# Patient Record
Sex: FEMALE | Race: WHITE | NOT HISPANIC OR LATINO | Employment: UNEMPLOYED | URBAN - METROPOLITAN AREA
[De-identification: names, ages, dates, MRNs, and addresses within clinical notes are randomized per-mention and may not be internally consistent; named-entity substitution may affect disease eponyms.]

---

## 2022-12-09 ENCOUNTER — APPOINTMENT (OUTPATIENT)
Dept: RADIOLOGY | Facility: HOSPITAL | Age: 32
End: 2022-12-09
Attending: EMERGENCY MEDICINE
Payer: COMMERCIAL

## 2022-12-09 ENCOUNTER — HOSPITAL ENCOUNTER (EMERGENCY)
Facility: HOSPITAL | Age: 32
Discharge: HOME | End: 2022-12-09
Admitting: PHYSICIAN ASSISTANT
Payer: COMMERCIAL

## 2022-12-09 VITALS
TEMPERATURE: 98.3 F | OXYGEN SATURATION: 98 % | HEART RATE: 100 BPM | WEIGHT: 142 LBS | RESPIRATION RATE: 23 BRPM | DIASTOLIC BLOOD PRESSURE: 74 MMHG | SYSTOLIC BLOOD PRESSURE: 134 MMHG

## 2022-12-09 DIAGNOSIS — J45.20 MILD INTERMITTENT ASTHMA WITHOUT COMPLICATION: ICD-10-CM

## 2022-12-09 LAB
ANION GAP SERPL CALCULATED.3IONS-SCNC: 17 MMOL/L (ref 7–15)
BASOPHILS # BLD AUTO: 0 10E3/UL (ref 0–0.2)
BASOPHILS NFR BLD AUTO: 1 %
BUN SERPL-MCNC: 9.3 MG/DL (ref 6–20)
CALCIUM SERPL-MCNC: 9.2 MG/DL (ref 8.6–10)
CHLORIDE SERPL-SCNC: 104 MMOL/L (ref 98–107)
CREAT SERPL-MCNC: 0.67 MG/DL (ref 0.51–0.95)
DEPRECATED HCO3 PLAS-SCNC: 25 MMOL/L (ref 22–29)
EOSINOPHIL # BLD AUTO: 0 10E3/UL (ref 0–0.7)
EOSINOPHIL NFR BLD AUTO: 0 %
ERYTHROCYTE [DISTWIDTH] IN BLOOD BY AUTOMATED COUNT: 15.4 % (ref 10–15)
GFR SERPL CREATININE-BSD FRML MDRD: >90 ML/MIN/1.73M2
GLUCOSE SERPL-MCNC: 123 MG/DL (ref 70–99)
HCT VFR BLD AUTO: 35.5 % (ref 35–47)
HGB BLD-MCNC: 12 G/DL (ref 11.7–15.7)
IMM GRANULOCYTES # BLD: 0 10E3/UL
IMM GRANULOCYTES NFR BLD: 0 %
LYMPHOCYTES # BLD AUTO: 3.7 10E3/UL (ref 0.8–5.3)
LYMPHOCYTES NFR BLD AUTO: 67 %
MCH RBC QN AUTO: 34.5 PG (ref 26.5–33)
MCHC RBC AUTO-ENTMCNC: 33.8 G/DL (ref 31.5–36.5)
MCV RBC AUTO: 102 FL (ref 78–100)
MONOCYTES # BLD AUTO: 0.3 10E3/UL (ref 0–1.3)
MONOCYTES NFR BLD AUTO: 6 %
NEUTROPHILS # BLD AUTO: 1.4 10E3/UL (ref 1.6–8.3)
NEUTROPHILS NFR BLD AUTO: 26 %
NRBC # BLD AUTO: 0 10E3/UL
NRBC BLD AUTO-RTO: 0 /100
PLATELET # BLD AUTO: 250 10E3/UL (ref 150–450)
POTASSIUM SERPL-SCNC: 3.9 MMOL/L (ref 3.4–5.3)
RBC # BLD AUTO: 3.48 10E6/UL (ref 3.8–5.2)
SODIUM SERPL-SCNC: 146 MMOL/L (ref 136–145)
WBC # BLD AUTO: 5.6 10E3/UL (ref 4–11)

## 2022-12-09 PROCEDURE — 999N000157 HC STATISTIC RCP TIME EA 10 MIN

## 2022-12-09 PROCEDURE — 71046 X-RAY EXAM CHEST 2 VIEWS: CPT

## 2022-12-09 PROCEDURE — 85004 AUTOMATED DIFF WBC COUNT: CPT | Performed by: EMERGENCY MEDICINE

## 2022-12-09 PROCEDURE — 99285 EMERGENCY DEPT VISIT HI MDM: CPT | Mod: 25

## 2022-12-09 PROCEDURE — 80048 BASIC METABOLIC PNL TOTAL CA: CPT | Performed by: EMERGENCY MEDICINE

## 2022-12-09 PROCEDURE — 250N000009 HC RX 250: Performed by: EMERGENCY MEDICINE

## 2022-12-09 PROCEDURE — 94640 AIRWAY INHALATION TREATMENT: CPT

## 2022-12-09 PROCEDURE — 36415 COLL VENOUS BLD VENIPUNCTURE: CPT | Performed by: EMERGENCY MEDICINE

## 2022-12-09 PROCEDURE — 93005 ELECTROCARDIOGRAM TRACING: CPT | Performed by: EMERGENCY MEDICINE

## 2022-12-09 RX ORDER — ALBUTEROL SULFATE 90 UG/1
2 AEROSOL, METERED RESPIRATORY (INHALATION) EVERY 6 HOURS PRN
Qty: 18 G | Refills: 0 | Status: SHIPPED | OUTPATIENT
Start: 2022-12-09

## 2022-12-09 RX ORDER — IPRATROPIUM BROMIDE AND ALBUTEROL SULFATE 2.5; .5 MG/3ML; MG/3ML
1 SOLUTION RESPIRATORY (INHALATION) EVERY 6 HOURS PRN
Qty: 90 ML | Refills: 1 | Status: SHIPPED | OUTPATIENT
Start: 2022-12-09

## 2022-12-09 RX ORDER — IPRATROPIUM BROMIDE AND ALBUTEROL SULFATE 2.5; .5 MG/3ML; MG/3ML
3 SOLUTION RESPIRATORY (INHALATION) ONCE
Status: COMPLETED | OUTPATIENT
Start: 2022-12-09 | End: 2022-12-09

## 2022-12-09 RX ADMIN — IPRATROPIUM BROMIDE AND ALBUTEROL SULFATE 3 ML: 2.5; .5 SOLUTION RESPIRATORY (INHALATION) at 12:10

## 2022-12-09 ASSESSMENT — ENCOUNTER SYMPTOMS
DIARRHEA: 0
ABDOMINAL PAIN: 0
VOMITING: 0
SHORTNESS OF BREATH: 1
COUGH: 1
NAUSEA: 0

## 2022-12-09 ASSESSMENT — ACTIVITIES OF DAILY LIVING (ADL): ADLS_ACUITY_SCORE: 35

## 2022-12-09 NOTE — ED NOTES
ED Triage Provider Note  [facility] EMERGENCY DEPARTMENT  Encounter Date: Dec 9, 2022    History:  Chief Complaint   Patient presents with     Shortness of Breath     July Baez is a 32 year old female who presents to the ED with shortness of breath. Patient endorses shortness of breath since Thanksgiving and has yet to get her asthma medication filled. Friend in triage states she has been more tired and confused today. No fevers, chest pain, falls or trauma.     Review of Systems:  Positive for shortness of breath    Exam:  BP (!) 142/88   Pulse 108   Temp 98.3  F (36.8  C) (Oral)   Resp 18   Wt 64.4 kg (142 lb)   SpO2 97%   General: No acute distress. Appears stated age.   Cardio: tachycardic rate, extremities well perfused  Resp: Normal work of breathing, grossly normal respiratory rate  Neuro: Alert. Facial movement grossly symmetric. Grossly intact strength.       Medical Decision Making:  Patient arriving to the ED with problem as above. A medical screening exam was performed. Initial differential diagnosis includes but not limited to Asthma exacerbation, less likely ACS, PNA, Covid..    Labs, imaging, meds orders initiated from Triage. The patient is most appropriate to be immediately roomed.       CHUY ELLINGTON MD  12/9/2022 at 11:50 AM     Chuy Ellington MD  12/09/22 3700

## 2022-12-09 NOTE — ED PROVIDER NOTES
EMERGENCY DEPARTMENT ENCOUNTER      NAME: July Baez  AGE: 32 year old female  YOB: 1990  MRN: [id number]  EVALUATION DATE & TIME: 12/9/2022 11:54 AM    PCP: No primary care provider on file.    ED PROVIDER: Emile Javed PA-C      Chief Complaint   Patient presents with     Shortness of Breath       FINAL IMPRESSION:  1. Mild intermittent asthma without complication        MEDICAL DECISION MAKING:    Pertinent Labs & Imaging studies reviewed. (See chart for details)  32 year old female presents to the Emergency Department for evaluation of shortness of breath likely related to asthma exacerbation.    Upon my interview and arrival the patient had a very received her nebulizing treatment.  She is feeling significantly better.  She has normal vital signs, specifically no persistent tachycardia, no hypoxia, she does not appear tachypneic.  No current wheezing on exam.  She denies any acute chest pain.  At this point time screening labs CBC and BMP are reviewed, chest x-ray is clear.  I do not feel that there is need for antibiotics.  I do not feel that there is need for oral steroids.  I will provide a refill of her inhaler which she has been out of as well as prescribe some neb solution and machine for her to use as needed at home.  If she has new or worsening symptoms she can consider returning to the ED for repeat assessment.    Medical Decision Making    History:    Supplemental history from: N/A    External Record(s) reviewed: Documented in HPI, if applicable.    Work Up:    Chart documentation includes differential considered and any EKGs or imaging interpreted by provider.    In additional to work up documented, I considered the following work up: See chart documentation, if applicable.    External consultation:    Discussion of management with another provider: See chart documentation, if applicable    Complicating factors:    Care impacted by chronic illness: Chronic Lung  Disease    Care affected by social determinants of health: N/A    Disposition considerations: Discharge. I prescribed additional prescription strength medication(s) as charted. N/A.        ED COURSE  12:32 PM I met with the patient, obtained history, performed an initial exam, and discussed options and plan for diagnostics and treatment here in the ED.    At the conclusion of the encounter I discussed the results of all of the tests and the disposition. The questions were answered. The patient or family acknowledged understanding and was agreeable with the care plan.     MEDICATIONS GIVEN IN THE EMERGENCY:  Medications   ipratropium - albuterol 0.5 mg/2.5 mg/3 mL (DUONEB) neb solution 3 mL (3 mLs Nebulization Given 12/9/22 1210)       NEW PRESCRIPTIONS STARTED AT TODAY'S ER VISIT  New Prescriptions    ALBUTEROL (PROAIR HFA/PROVENTIL HFA/VENTOLIN HFA) 108 (90 BASE) MCG/ACT INHALER    Inhale 2 puffs into the lungs every 6 hours as needed for shortness of breath / dyspnea or wheezing    IPRATROPIUM - ALBUTEROL 0.5 MG/2.5 MG/3 ML (DUONEB) 0.5-2.5 (3) MG/3ML NEB SOLUTION    Take 1 vial (3 mLs) by nebulization every 6 hours as needed for shortness of breath / dyspnea or wheezing    RESPIRATORY THERAPY SUPPLIES (NEBULIZER) PEG    Take 1 Device by mouth 4 times daily as needed (shortness of breath)     =================================================================    HPI    Patient information was obtained from: patient    Use of Interpretor: N/A    July Baez is a 32 year old female with a reported history of asthma and DM who presents to this ED by walk in for evaluation of shortness of breath. Patient reports a history of asthma and occasionally has intermittent flares. She reports that she has been feeling more short of breath since 11/24/22 (~2 weeks ago). She has an inhaler at home but reports that it ran out, and states she went to [facility] ED a couple of days ago to get her inhaler prescription  refilled, but LWBS due to long wait times. Presents to the ED again today for continued shortness of breath. Also endorses a dry cough. Her inhaler has ran out and she does not have a nebulizer at home. She received a DuoNeb today on arrival to the ED and reports that she is already feeling better. Denies any associated chest pain. Denies any abdominal pain, nausea, vomiting, or diarrhea. She does not smoke. She also reports that she has been told she is a diabetic, but states that she does not take any medication or insulin for diabetes. She denies any other complaints or concerns.      REVIEW OF SYSTEMS  Review of Systems   Respiratory: Positive for cough (dry) and shortness of breath.    Cardiovascular: Negative for chest pain.   Gastrointestinal: Negative for abdominal pain, diarrhea, nausea and vomiting.   All other systems reviewed and are negative.      PAST MEDICAL HISTORY:  History reviewed. No pertinent past medical history.    PAST SURGICAL HISTORY:  History reviewed. No pertinent surgical history.    CURRENT MEDICATIONS:    No current facility-administered medications for this encounter.    Current Outpatient Medications:      albuterol (PROAIR HFA/PROVENTIL HFA/VENTOLIN HFA) 108 (90 Base) MCG/ACT inhaler, Inhale 2 puffs into the lungs every 6 hours as needed for shortness of breath / dyspnea or wheezing, Disp: 18 g, Rfl: 0     ipratropium - albuterol 0.5 mg/2.5 mg/3 mL (DUONEB) 0.5-2.5 (3) MG/3ML neb solution, Take 1 vial (3 mLs) by nebulization every 6 hours as needed for shortness of breath / dyspnea or wheezing, Disp: 90 mL, Rfl: 1     Respiratory Therapy Supplies (NEBULIZER) PEG, Take 1 Device by mouth 4 times daily as needed (shortness of breath), Disp: 1 each, Rfl: 0    ALLERGIES:  No Known Allergies    FAMILY HISTORY:  History reviewed. No pertinent family history.    SOCIAL HISTORY:   Social History     Socioeconomic History     Marital status: Single     Spouse name: None     Number of  children: None     Years of education: None     Highest education level: None       VITALS:  Patient Vitals for the past 24 hrs:   BP Temp Temp src Pulse Resp SpO2 Weight   12/09/22 1230 127/74 -- -- 99 -- 98 % --   12/09/22 1217 -- -- -- 93 23 100 % --   12/09/22 1210 -- -- -- -- -- 100 % --   12/09/22 1152 (!) 142/88 98.3  F (36.8  C) Oral 108 18 97 % 64.4 kg (142 lb)       PHYSICAL EXAM    Physical Exam  Vitals and nursing note reviewed.   Constitutional:       General: She is not in acute distress.     Appearance: She is normal weight. She is not ill-appearing or toxic-appearing.   HENT:      Head: Normocephalic.      Right Ear: External ear normal.      Left Ear: External ear normal.      Nose: Nose normal.      Mouth/Throat:      Mouth: Mucous membranes are moist.   Eyes:      Conjunctiva/sclera: Conjunctivae normal.   Cardiovascular:      Rate and Rhythm: Normal rate.      Pulses: Normal pulses.      Heart sounds: No murmur heard.  Pulmonary:      Effort: Pulmonary effort is normal. No respiratory distress.      Breath sounds: No stridor. No wheezing, rhonchi or rales.   Musculoskeletal:         General: No tenderness or deformity. Normal range of motion.      Cervical back: Normal range of motion.      Right lower leg: No edema.      Left lower leg: No edema.   Skin:     General: Skin is warm and dry.      Findings: No rash.   Neurological:      General: No focal deficit present.      Mental Status: She is alert.      Sensory: No sensory deficit.      Motor: No weakness.          LAB:  All pertinent labs reviewed and interpreted.  Results for orders placed or performed during the hospital encounter of 12/09/22   XR Chest 2 Views    Impression    IMPRESSION: Negative chest.   Basic metabolic panel   Result Value Ref Range    Sodium 146 (H) 136 - 145 mmol/L    Potassium 3.9 3.4 - 5.3 mmol/L    Chloride 104 98 - 107 mmol/L    Carbon Dioxide (CO2) 25 22 - 29 mmol/L    Anion Gap 17 (H) 7 - 15 mmol/L    Urea  Nitrogen 9.3 6.0 - 20.0 mg/dL    Creatinine 0.67 0.51 - 0.95 mg/dL    Calcium 9.2 8.6 - 10.0 mg/dL    Glucose 123 (H) 70 - 99 mg/dL    GFR Estimate >90 >60 mL/min/1.73m2   CBC with platelets and differential   Result Value Ref Range    WBC Count 5.6 4.0 - 11.0 10e3/uL    RBC Count 3.48 (L) 3.80 - 5.20 10e6/uL    Hemoglobin 12.0 11.7 - 15.7 g/dL    Hematocrit 35.5 35.0 - 47.0 %     (H) 78 - 100 fL    MCH 34.5 (H) 26.5 - 33.0 pg    MCHC 33.8 31.5 - 36.5 g/dL    RDW 15.4 (H) 10.0 - 15.0 %    Platelet Count 250 150 - 450 10e3/uL    % Neutrophils 26 %    % Lymphocytes 67 %    % Monocytes 6 %    % Eosinophils 0 %    % Basophils 1 %    % Immature Granulocytes 0 %    NRBCs per 100 WBC 0 <1 /100    Absolute Neutrophils 1.4 (L) 1.6 - 8.3 10e3/uL    Absolute Lymphocytes 3.7 0.8 - 5.3 10e3/uL    Absolute Monocytes 0.3 0.0 - 1.3 10e3/uL    Absolute Eosinophils 0.0 0.0 - 0.7 10e3/uL    Absolute Basophils 0.0 0.0 - 0.2 10e3/uL    Absolute Immature Granulocytes 0.0 <=0.4 10e3/uL    Absolute NRBCs 0.0 10e3/uL       RADIOLOGY:  Reviewed all pertinent imaging. Please see official radiology report.  XR Chest 2 Views   Final Result   IMPRESSION: Negative chest.          EKG:    Performed at: 12:04    Impression: Sinus tachycardia at 102 bpm.    Rate: 102 bpm  Rhythm: Sinus  Axis: 75, 45, 42  IN Interval: 150  QRS Interval: 66  QTc Interval: 456  ST Changes: None  Comparison: No previous EKGs available for comparison.    I have independently reviewed and interpreted the EKG(s) documented above.        I, August Cordero, am serving as a scribe to document services personally performed by Emile Javed PA-C based on my observation and the provider's statements to me. I, Emile Javed PA-C attest that August Cordero is acting in a scribe capacity, has observed my performance of the services and has documented them in accordance with my direction.    Emile Javed PA-C  Emergency Medicine  [facility]     Emile Javed,  MALCOLM  12/09/22 [id number]

## 2022-12-09 NOTE — ED TRIAGE NOTES
Patient presents here with complaint of shortness of breath that she reports that has occurred over the  Past three days.

## 2024-07-26 ENCOUNTER — HOSPITAL ENCOUNTER (EMERGENCY)
Facility: CLINIC | Age: 34
Discharge: HOME | End: 2024-07-26
Attending: STUDENT IN AN ORGANIZED HEALTH CARE EDUCATION/TRAINING PROGRAM | Admitting: STUDENT IN AN ORGANIZED HEALTH CARE EDUCATION/TRAINING PROGRAM
Payer: COMMERCIAL

## 2024-07-26 VITALS
OXYGEN SATURATION: 95 % | TEMPERATURE: 98.8 F | WEIGHT: 170 LBS | BODY MASS INDEX: 27.32 KG/M2 | DIASTOLIC BLOOD PRESSURE: 82 MMHG | SYSTOLIC BLOOD PRESSURE: 125 MMHG | HEART RATE: 84 BPM | RESPIRATION RATE: 22 BRPM | HEIGHT: 66 IN

## 2024-07-26 DIAGNOSIS — K29.70 GASTRITIS WITHOUT BLEEDING, UNSPECIFIED CHRONICITY, UNSPECIFIED GASTRITIS TYPE: ICD-10-CM

## 2024-07-26 DIAGNOSIS — N39.0 ACUTE UTI: ICD-10-CM

## 2024-07-26 LAB
ALBUMIN SERPL BCG-MCNC: 4.7 G/DL (ref 3.5–5.2)
ALBUMIN UR-MCNC: 20 MG/DL
ALP SERPL-CCNC: 94 U/L (ref 40–150)
ALT SERPL W P-5'-P-CCNC: 63 U/L (ref 0–50)
ANION GAP SERPL CALCULATED.3IONS-SCNC: 12 MMOL/L (ref 7–15)
APPEARANCE UR: ABNORMAL
AST SERPL W P-5'-P-CCNC: 57 U/L (ref 0–45)
ATRIAL RATE - MUSE: 102 BPM
BACTERIA #/AREA URNS HPF: ABNORMAL /HPF
BASOPHILS # BLD AUTO: 0 10E3/UL (ref 0–0.2)
BASOPHILS NFR BLD AUTO: 0 %
BILIRUB DIRECT SERPL-MCNC: 0.31 MG/DL (ref 0–0.3)
BILIRUB SERPL-MCNC: 1 MG/DL
BILIRUB UR QL STRIP: NEGATIVE
BUN SERPL-MCNC: 8.7 MG/DL (ref 6–20)
CALCIUM SERPL-MCNC: 10 MG/DL (ref 8.8–10.4)
CHLORIDE SERPL-SCNC: 91 MMOL/L (ref 98–107)
COLOR UR AUTO: YELLOW
CREAT SERPL-MCNC: 0.95 MG/DL (ref 0.51–0.95)
DIASTOLIC BLOOD PRESSURE - MUSE: NORMAL MMHG
EGFRCR SERPLBLD CKD-EPI 2021: 80 ML/MIN/1.73M2
EOSINOPHIL # BLD AUTO: 0 10E3/UL (ref 0–0.7)
EOSINOPHIL NFR BLD AUTO: 0 %
ERYTHROCYTE [DISTWIDTH] IN BLOOD BY AUTOMATED COUNT: 14.7 % (ref 10–15)
GLUCOSE SERPL-MCNC: 112 MG/DL (ref 70–99)
GLUCOSE UR STRIP-MCNC: NEGATIVE MG/DL
HCG UR QL: NEGATIVE
HCO3 SERPL-SCNC: 32 MMOL/L (ref 22–29)
HCT VFR BLD AUTO: 38.2 % (ref 35–47)
HGB BLD-MCNC: 13.2 G/DL (ref 11.7–15.7)
HGB UR QL STRIP: NEGATIVE
IMM GRANULOCYTES # BLD: 0 10E3/UL
IMM GRANULOCYTES NFR BLD: 0 %
INTERPRETATION ECG - MUSE: NORMAL
KETONES UR STRIP-MCNC: ABNORMAL MG/DL
LEUKOCYTE ESTERASE UR QL STRIP: ABNORMAL
LIPASE SERPL-CCNC: 88 U/L (ref 13–60)
LYMPHOCYTES # BLD AUTO: 2.2 10E3/UL (ref 0.8–5.3)
LYMPHOCYTES NFR BLD AUTO: 26 %
MAGNESIUM SERPL-MCNC: 1.5 MG/DL (ref 1.7–2.3)
MCH RBC QN AUTO: 35.5 PG (ref 26.5–33)
MCHC RBC AUTO-ENTMCNC: 34.6 G/DL (ref 31.5–36.5)
MCV RBC AUTO: 103 FL (ref 78–100)
MONOCYTES # BLD AUTO: 0.6 10E3/UL (ref 0–1.3)
MONOCYTES NFR BLD AUTO: 8 %
MUCOUS THREADS #/AREA URNS LPF: PRESENT /LPF
NEUTROPHILS # BLD AUTO: 5.7 10E3/UL (ref 1.6–8.3)
NEUTROPHILS NFR BLD AUTO: 66 %
NITRATE UR QL: POSITIVE
NRBC # BLD AUTO: 0 10E3/UL
NRBC BLD AUTO-RTO: 0 /100
P AXIS - MUSE: 72 DEGREES
PH UR STRIP: 6 [PH] (ref 5–7)
PLATELET # BLD AUTO: 150 10E3/UL (ref 150–450)
POTASSIUM SERPL-SCNC: 3.2 MMOL/L (ref 3.4–5.3)
PR INTERVAL - MUSE: 140 MS
PROT SERPL-MCNC: 7.7 G/DL (ref 6.4–8.3)
QRS DURATION - MUSE: 70 MS
QT - MUSE: 354 MS
QTC - MUSE: 461 MS
R AXIS - MUSE: 3 DEGREES
RBC # BLD AUTO: 3.72 10E6/UL (ref 3.8–5.2)
RBC URINE: 5 /HPF
SODIUM SERPL-SCNC: 135 MMOL/L (ref 135–145)
SP GR UR STRIP: 1.01 (ref 1–1.03)
SQUAMOUS EPITHELIAL: 2 /HPF
SYSTOLIC BLOOD PRESSURE - MUSE: NORMAL MMHG
T AXIS - MUSE: 23 DEGREES
TROPONIN T SERPL HS-MCNC: <6 NG/L
TSH SERPL DL<=0.005 MIU/L-ACNC: 2.95 UIU/ML (ref 0.3–4.2)
UROBILINOGEN UR STRIP-MCNC: <2 MG/DL
VENTRICULAR RATE- MUSE: 102 BPM
WBC # BLD AUTO: 8.5 10E3/UL (ref 4–11)
WBC URINE: 56 /HPF

## 2024-07-26 PROCEDURE — 81003 URINALYSIS AUTO W/O SCOPE: CPT | Performed by: STUDENT IN AN ORGANIZED HEALTH CARE EDUCATION/TRAINING PROGRAM

## 2024-07-26 PROCEDURE — 84443 ASSAY THYROID STIM HORMONE: CPT | Performed by: STUDENT IN AN ORGANIZED HEALTH CARE EDUCATION/TRAINING PROGRAM

## 2024-07-26 PROCEDURE — 250N000011 HC RX IP 250 OP 636: Performed by: STUDENT IN AN ORGANIZED HEALTH CARE EDUCATION/TRAINING PROGRAM

## 2024-07-26 PROCEDURE — 82248 BILIRUBIN DIRECT: CPT | Performed by: STUDENT IN AN ORGANIZED HEALTH CARE EDUCATION/TRAINING PROGRAM

## 2024-07-26 PROCEDURE — 81025 URINE PREGNANCY TEST: CPT | Performed by: STUDENT IN AN ORGANIZED HEALTH CARE EDUCATION/TRAINING PROGRAM

## 2024-07-26 PROCEDURE — 258N000003 HC RX IP 258 OP 636: Performed by: STUDENT IN AN ORGANIZED HEALTH CARE EDUCATION/TRAINING PROGRAM

## 2024-07-26 PROCEDURE — 83690 ASSAY OF LIPASE: CPT | Performed by: STUDENT IN AN ORGANIZED HEALTH CARE EDUCATION/TRAINING PROGRAM

## 2024-07-26 PROCEDURE — 36415 COLL VENOUS BLD VENIPUNCTURE: CPT | Performed by: STUDENT IN AN ORGANIZED HEALTH CARE EDUCATION/TRAINING PROGRAM

## 2024-07-26 PROCEDURE — 96360 HYDRATION IV INFUSION INIT: CPT | Performed by: STUDENT IN AN ORGANIZED HEALTH CARE EDUCATION/TRAINING PROGRAM

## 2024-07-26 PROCEDURE — 93005 ELECTROCARDIOGRAM TRACING: CPT | Performed by: STUDENT IN AN ORGANIZED HEALTH CARE EDUCATION/TRAINING PROGRAM

## 2024-07-26 PROCEDURE — 250N000013 HC RX MED GY IP 250 OP 250 PS 637: Performed by: STUDENT IN AN ORGANIZED HEALTH CARE EDUCATION/TRAINING PROGRAM

## 2024-07-26 PROCEDURE — 87086 URINE CULTURE/COLONY COUNT: CPT | Performed by: STUDENT IN AN ORGANIZED HEALTH CARE EDUCATION/TRAINING PROGRAM

## 2024-07-26 PROCEDURE — 80053 COMPREHEN METABOLIC PANEL: CPT | Performed by: STUDENT IN AN ORGANIZED HEALTH CARE EDUCATION/TRAINING PROGRAM

## 2024-07-26 PROCEDURE — 87186 SC STD MICRODIL/AGAR DIL: CPT | Performed by: STUDENT IN AN ORGANIZED HEALTH CARE EDUCATION/TRAINING PROGRAM

## 2024-07-26 PROCEDURE — 99284 EMERGENCY DEPT VISIT MOD MDM: CPT | Mod: 25 | Performed by: STUDENT IN AN ORGANIZED HEALTH CARE EDUCATION/TRAINING PROGRAM

## 2024-07-26 PROCEDURE — 84484 ASSAY OF TROPONIN QUANT: CPT | Performed by: STUDENT IN AN ORGANIZED HEALTH CARE EDUCATION/TRAINING PROGRAM

## 2024-07-26 PROCEDURE — 93005 ELECTROCARDIOGRAM TRACING: CPT | Performed by: EMERGENCY MEDICINE

## 2024-07-26 PROCEDURE — 96361 HYDRATE IV INFUSION ADD-ON: CPT | Performed by: STUDENT IN AN ORGANIZED HEALTH CARE EDUCATION/TRAINING PROGRAM

## 2024-07-26 PROCEDURE — 85025 COMPLETE CBC W/AUTO DIFF WBC: CPT | Performed by: STUDENT IN AN ORGANIZED HEALTH CARE EDUCATION/TRAINING PROGRAM

## 2024-07-26 PROCEDURE — 83735 ASSAY OF MAGNESIUM: CPT | Performed by: STUDENT IN AN ORGANIZED HEALTH CARE EDUCATION/TRAINING PROGRAM

## 2024-07-26 RX ORDER — POTASSIUM CHLORIDE 1.5 G/1.58G
40 POWDER, FOR SOLUTION ORAL ONCE
Status: COMPLETED | OUTPATIENT
Start: 2024-07-26 | End: 2024-07-26

## 2024-07-26 RX ORDER — FAMOTIDINE 20 MG/1
20 TABLET, FILM COATED ORAL ONCE
Status: COMPLETED | OUTPATIENT
Start: 2024-07-26 | End: 2024-07-26

## 2024-07-26 RX ORDER — MAGNESIUM HYDROXIDE/ALUMINUM HYDROXICE/SIMETHICONE 120; 1200; 1200 MG/30ML; MG/30ML; MG/30ML
15 SUSPENSION ORAL ONCE
Status: COMPLETED | OUTPATIENT
Start: 2024-07-26 | End: 2024-07-26

## 2024-07-26 RX ORDER — CEFUROXIME AXETIL 250 MG/1
250 TABLET ORAL 2 TIMES DAILY
Qty: 10 TABLET | Refills: 0 | Status: SHIPPED | OUTPATIENT
Start: 2024-07-26

## 2024-07-26 RX ORDER — ONDANSETRON 4 MG/1
4 TABLET, ORALLY DISINTEGRATING ORAL EVERY 8 HOURS PRN
Qty: 10 TABLET | Refills: 0 | Status: SHIPPED | OUTPATIENT
Start: 2024-07-26 | End: 2024-07-29

## 2024-07-26 RX ORDER — CEFUROXIME AXETIL 250 MG/1
250 TABLET ORAL ONCE
Status: COMPLETED | OUTPATIENT
Start: 2024-07-26 | End: 2024-07-26

## 2024-07-26 RX ORDER — SUCRALFATE 1 G/1
1 TABLET ORAL 4 TIMES DAILY
Qty: 40 TABLET | Refills: 0 | Status: SHIPPED | OUTPATIENT
Start: 2024-07-26

## 2024-07-26 RX ORDER — PANTOPRAZOLE SODIUM 40 MG/1
40 TABLET, DELAYED RELEASE ORAL ONCE
Status: COMPLETED | OUTPATIENT
Start: 2024-07-26 | End: 2024-07-26

## 2024-07-26 RX ORDER — SUCRALFATE 1 G/1
1 TABLET ORAL ONCE
Status: COMPLETED | OUTPATIENT
Start: 2024-07-26 | End: 2024-07-26

## 2024-07-26 RX ORDER — POTASSIUM CHLORIDE 1500 MG/1
40 TABLET, EXTENDED RELEASE ORAL ONCE
Status: COMPLETED | OUTPATIENT
Start: 2024-07-26 | End: 2024-07-26

## 2024-07-26 RX ORDER — FAMOTIDINE 20 MG/1
20 TABLET, FILM COATED ORAL 2 TIMES DAILY
Qty: 60 TABLET | Refills: 0 | Status: SHIPPED | OUTPATIENT
Start: 2024-07-26

## 2024-07-26 RX ORDER — ONDANSETRON 4 MG/1
4 TABLET, ORALLY DISINTEGRATING ORAL ONCE
Status: COMPLETED | OUTPATIENT
Start: 2024-07-26 | End: 2024-07-26

## 2024-07-26 RX ADMIN — SODIUM CHLORIDE 1000 ML: 9 INJECTION, SOLUTION INTRAVENOUS at 16:31

## 2024-07-26 RX ADMIN — SUCRALFATE 1 G: 1 TABLET ORAL at 17:50

## 2024-07-26 RX ADMIN — ALUMINUM HYDROXIDE, MAGNESIUM HYDROXIDE, AND SIMETHICONE 15 ML: 1200; 120; 1200 SUSPENSION ORAL at 16:33

## 2024-07-26 RX ADMIN — POTASSIUM CHLORIDE 40 MEQ: 1500 TABLET, EXTENDED RELEASE ORAL at 17:20

## 2024-07-26 RX ADMIN — FAMOTIDINE 20 MG: 20 TABLET, FILM COATED ORAL at 17:50

## 2024-07-26 RX ADMIN — CEFUROXIME AXETIL 250 MG: 250 TABLET ORAL at 19:30

## 2024-07-26 RX ADMIN — PANTOPRAZOLE SODIUM 40 MG: 40 TABLET, DELAYED RELEASE ORAL at 16:33

## 2024-07-26 RX ADMIN — ONDANSETRON 4 MG: 4 TABLET, ORALLY DISINTEGRATING ORAL at 16:15

## 2024-07-26 ASSESSMENT — COLUMBIA-SUICIDE SEVERITY RATING SCALE - C-SSRS
2. HAVE YOU ACTUALLY HAD ANY THOUGHTS OF KILLING YOURSELF IN THE PAST MONTH?: NO
6. HAVE YOU EVER DONE ANYTHING, STARTED TO DO ANYTHING, OR PREPARED TO DO ANYTHING TO END YOUR LIFE?: NO
1. IN THE PAST MONTH, HAVE YOU WISHED YOU WERE DEAD OR WISHED YOU COULD GO TO SLEEP AND NOT WAKE UP?: NO

## 2024-07-26 ASSESSMENT — ACTIVITIES OF DAILY LIVING (ADL)
ADLS_ACUITY_SCORE: 37
ADLS_ACUITY_SCORE: 37
ADLS_ACUITY_SCORE: 33
ADLS_ACUITY_SCORE: 35

## 2024-07-26 NOTE — ED TRIAGE NOTES
Pt arrives with chest pain that started 2 weeks ago. Went to  at that time and was told she had a fatty liver. Hx of pancreatitis.     Triage Assessment (Adult)       Row Name 07/26/24 1504          Triage Assessment    Airway WDL WDL        Respiratory WDL    Respiratory WDL WDL        Skin Circulation/Temperature WDL    Skin Circulation/Temperature WDL WDL        Cardiac WDL    Cardiac WDL chest pain     Cardiac Rhythm ST        Chest Pain Assessment    Chest Pain Location anterior chest, right;anterior chest, left;epigastric     Associated Signs/Symptoms diaphoresis;vomiting;tachycardia        Peripheral/Neurovascular WDL    Peripheral Neurovascular WDL WDL        Cognitive/Neuro/Behavioral WDL    Cognitive/Neuro/Behavioral WDL WDL

## 2024-07-26 NOTE — ED PROVIDER NOTES
Emergency Department Midlevel Supervisory Note     I had a face to face encounter with this patient seen by the Advanced Practice Provider (RYLAN). I personally made/approved the management plan and take responsibility for the patient management. I personally saw patient and performed a substantive portion of the visit including all aspects of the medical decision making.     ED Course:  3:42 PM  Nayeli Moseley DO, [facility] Resident  staffed patient with me. I agree with their assessment and plan of management, and I will see the patient.  4:00 PM I met with the patient to introduce myself, gather additional history, perform my initial exam, and discuss the plan.     Brief HPI:     July Baez is a 33 year old female who presents for evaluation of chest pain     Patient is a 33-year-old female with history of pancreatitis, hepatic steatosis, asthma, gastritis who presents emergency department for evaluation of a burning sensation in her chest/epigastrium/abdominal pain, nausea vomiting and tingling in her upper extremities as well as face for the past several days.  She has noted intermittent burning in her chest for the past several weeks but got worse on Tuesday and Wednesday and has been more persistent since.  She has had several episodes of vomiting as well with some faint blood streaks a few days ago but no hematemesis today.  No blood in her stools.  Denies any hematuria but has had a minimal amount of urinary discomfort.  No fevers at home.  No new cough.  No diplopia or significant lightheadedness.  No difficulty with ambulation.  Has had a prior cholecystectomy as well as appendectomy.   denies prior history of blood clots.  Also notes intermittent feeling  of sweatiness and fatigue over the past several weeks.      I, Olya Brown, am serving as a scribe to document services personally performed by Greg Griggs MD, based on my observations and the provider's statements to me.   IGreg  "MD Anson attest that Olya Brown was acting in a scribe capacity, has observed my performance of the services and has documented them in accordance with my direction.    Brief Physical Exam: /82   Pulse 84   Temp 98.8  F (37.1  C) (Oral)   Resp 22   Ht 1.676 m (5' 6\")   Wt 77.1 kg (170 lb)   LMP 06/04/2024   SpO2 95%   BMI 27.44 kg/m    Constitutional:  Alert, in no acute distress  EYES: Conjunctivae clear  HENT:  Atraumatic  Respiratory:  Respirations even, unlabored, in no acute respiratory distress  Cardiovascular:  Regular rate and rhythm, good peripheral perfusion  GI: No distention, mild epigastric tenderness without any rebound or guarding, no palpable masses  Musculoskeletal:  Moves all 4 extremities equally, grossly symmetrical strength, mild tenderness palpation of the left trapezius muscle, no overlying skin changes  Integument: Warm & dry. No appreciable rash, erythema.  Neurologic:  Alert & oriented, speech clear and fluent, no facial droop, symmetric strength in upper lower extremities bilaterally with normal coordination with finger-nose, intact sensation to touch in upper lower extremities bilaterally  Psych: Normal mood and affect       MDM:    ED Course as of 07/26/24 2338   Fri Jul 26, 2024   1531 EKG shows tachycardia, no STEMI seen   1547 EKG shows sinus tachycardia 102 beats a minute, normal axis, normal WA, QRS and QTc durations, no ST elevations or acute ischemic T wave changes.   1626 Patient is a 33-year-old female with history of pancreatitis, hepatic steatosis, asthma, gastritis who presents emergency department for evaluation of a burning sensation in her chest/epigastrium/abdominal pain, nausea vomiting and tingling in her upper extremities as well as face for the past several days.  She has noted intermittent burning in her chest for the past several weeks but got worse on Tuesday and Wednesday and has been more persistent since.  She has had several episodes of " vomiting as well with some faint blood streaks a few days ago but no hematemesis today.  No blood in her stools.  Denies any hematuria but has had a minimal amount of urinary discomfort.  No fevers at home.  No new cough.  No diplopia or significant lightheadedness.  No difficulty with ambulation.  Has had a prior cholecystectomy as well as appendectomy.   denies prior history of blood clots.  Also notes intermittent feeling  of sweatiness and fatigue over the past several weeks.    Exam patient is well-appearing no acute distress.  Hemodynamically stable and afebrile.  Saturating well on room air.  Borderline tachycardic.  Lungs are clear without any wheezes or rails.  Somewhat diffuse abdominal tenderness without any guarding or rebound.  No lower extremity edema.    Initial differential includes was not limited to pancreatitis, gastritis, ACS.  Abdominal exam is reassuring here without any focal tenderness or rebound or guarding and has had prior cholecystectomy and appendectomy.  Will hold off on cross-sectional ridging at this point in time.  Will obtain blood work to evaluate for signs of pancreatitis.  Also EKG and troponin.  Will also check a TSH eval for possible hyperthyroidism.   1716 Labs reviewed and interpreted myself.  BMP shows mild hypokalemia at 3.2 oral repletion ordered.  No anion gap.  CBC shows a normal white count and hemoglobin.  Mild transaminitis but bilirubin within normal limits.  Lipase 88 but not 3 times upper limit of normal.  Magnesium slightly low at 1.5.  TSH within normal limits.  Troponin is negative at less than 6 and doubt ACS given duration of her symptoms.   1717 Patient states that she cannot take the oral potassium powder because it burns too much therefore extended release tablet ordered.     After antiemetics and analgesia and GI cocktail emergency department patient had significant improvement in her symptoms.  At this point in time low suspicion for possible gastritis as  cause of her symptoms.  She does have some findings suggestive of urinary tract infection as well although no CVA tenderness or fevers or significant leukocytosis and doubt pyelonephritis at this point in time.  Will cover empirically with cefuroxime for urinary tract infection but otherwise she is now tolerating oral intake and feels comfortable discharge home.  Signs symptoms of worsening infection discussed return precautions given.  Otherwise patient should follow-up in routine fashion with your primary care doctor and GI doctor.    1. Acute UTI    2. Gastritis without bleeding, unspecified chronicity, unspecified gastritis type        Consults:      Labs and Imaging:  Results for orders placed or performed during the hospital encounter of 07/26/24   Basic metabolic panel   Result Value Ref Range    Sodium 135 135 - 145 mmol/L    Potassium 3.2 (L) 3.4 - 5.3 mmol/L    Chloride 91 (L) 98 - 107 mmol/L    Carbon Dioxide (CO2) 32 (H) 22 - 29 mmol/L    Anion Gap 12 7 - 15 mmol/L    Urea Nitrogen 8.7 6.0 - 20.0 mg/dL    Creatinine 0.95 0.51 - 0.95 mg/dL    GFR Estimate 80 >60 mL/min/1.73m2    Calcium 10.0 8.8 - 10.4 mg/dL    Glucose 112 (H) 70 - 99 mg/dL   Result Value Ref Range    Troponin T, High Sensitivity <6 <=14 ng/L   Result Value Ref Range    Lipase 88 (H) 13 - 60 U/L   Hepatic function panel   Result Value Ref Range    Protein Total 7.7 6.4 - 8.3 g/dL    Albumin 4.7 3.5 - 5.2 g/dL    Bilirubin Total 1.0 <=1.2 mg/dL    Alkaline Phosphatase 94 40 - 150 U/L    AST 57 (H) 0 - 45 U/L    ALT 63 (H) 0 - 50 U/L    Bilirubin Direct 0.31 (H) 0.00 - 0.30 mg/dL   TSH with free T4 reflex   Result Value Ref Range    TSH 2.95 0.30 - 4.20 uIU/mL   Result Value Ref Range    Magnesium 1.5 (L) 1.7 - 2.3 mg/dL   UA with Microscopic reflex to Culture    Specimen: Urine, NOS   Result Value Ref Range    Color Urine Yellow Colorless, Straw, Light Yellow, Yellow    Appearance Urine Turbid (A) Clear    Glucose Urine Negative Negative  mg/dL    Bilirubin Urine Negative Negative    Ketones Urine Trace (A) Negative mg/dL    Specific Gravity Urine 1.013 1.001 - 1.030    Blood Urine Negative Negative    pH Urine 6.0 5.0 - 7.0    Protein Albumin Urine 20 (A) Negative mg/dL    Urobilinogen Urine <2.0 <2.0 mg/dL    Nitrite Urine Positive (A) Negative    Leukocyte Esterase Urine 500 [place]/uL (A) Negative    Bacteria Urine Many (A) None Seen /HPF    Mucus Urine Present (A) None Seen /LPF    RBC Urine 5 (H) <=2 /HPF    WBC Urine 56 (H) <=5 /HPF    Squamous Epithelials Urine 2 (H) <=1 /HPF   HCG qualitative urine (UPT)   Result Value Ref Range    hCG Urine Qualitative Negative Negative   CBC with platelets and differential   Result Value Ref Range    WBC Count 8.5 4.0 - 11.0 10e3/uL    RBC Count 3.72 (L) 3.80 - 5.20 10e6/uL    Hemoglobin 13.2 11.7 - 15.7 g/dL    Hematocrit 38.2 35.0 - 47.0 %     (H) 78 - 100 fL    MCH 35.5 (H) 26.5 - 33.0 pg    MCHC 34.6 31.5 - 36.5 g/dL    RDW 14.7 10.0 - 15.0 %    Platelet Count 150 150 - 450 10e3/uL    % Neutrophils 66 %    % Lymphocytes 26 %    % Monocytes 8 %    % Eosinophils 0 %    % Basophils 0 %    % Immature Granulocytes 0 %    NRBCs per 100 WBC 0 <1 /100    Absolute Neutrophils 5.7 1.6 - 8.3 10e3/uL    Absolute Lymphocytes 2.2 0.8 - 5.3 10e3/uL    Absolute Monocytes 0.6 0.0 - 1.3 10e3/uL    Absolute Eosinophils 0.0 0.0 - 0.7 10e3/uL    Absolute Basophils 0.0 0.0 - 0.2 10e3/uL    Absolute Immature Granulocytes 0.0 <=0.4 10e3/uL    Absolute NRBCs 0.0 10e3/uL   ECG 12-LEAD WITH MUSE (LHE)   Result Value Ref Range    Systolic Blood Pressure  mmHg    Diastolic Blood Pressure  mmHg    Ventricular Rate 102 BPM    Atrial Rate 102 BPM    IL Interval 140 ms    QRS Duration 70 ms     ms    QTc 461 ms    P Axis 72 degrees    R AXIS 3 degrees    T Axis 23 degrees    Interpretation ECG       Sinus tachycardia  Otherwise normal ECG  When compared with ECG of 09-Dec-2022 12:04,  No significant change was  found  Confirmed by SEE ED PROVIDER NOTE FOR, ECG INTERPRETATION ([id number]),  Connor Mack ([id number]) on 7/26/2024 4:32:57 PM         I have reviewed the relevant laboratory studies above.    I independently interpreted the following imaging study(s):       EKG: I reviewed and independently interpreted the patient's EKG, with comments made as listed below. Please see scanned EKG for full report.     EKG shows sinus tachycardia 102 beats a minute, normal axis, normal NV, QRS and QTc durations, no ST elevations or acute ischemic T wave changes.    Procedures:  I was present for the key portions of procedures documented in RYLAN/midlevel note, see midlevel note for further details.    Greg Griggs MD  [facility] EMERGENCY ROOM  [address] [place] [id number] [phone]       Greg Girggs MD  07/26/24 1069

## 2024-07-26 NOTE — ED PROVIDER NOTES
EMERGENCY DEPARTMENT ENCOUNTER      NAME: July Baez  AGE: 33 year old female  YOB: 1990  MRN: [id number]  EVALUATION DATE & TIME: 7/26/2024  4:00 PM    PCP: Tea Newsome    ED PROVIDER: Nayeli Moselye DO PGY-1      Chief Complaint   Patient presents with    Chest Pain    Numbness         FINAL IMPRESSION:  1. Acute UTI    2. Gastritis without bleeding, unspecified chronicity, unspecified gastritis type          ED COURSE & MEDICAL DECISION MAKING:    Pertinent Labs & Imaging studies reviewed. (See chart for details)  33 year old female presents to the Emergency Department for evaluation of abdominal pain and diaphoresis for the last 2 weeks intermittently. She is also having chest pain described as a heavy pressure and present since 7/23. The abdominal pain is localized to her epigastric region and is sharp. Whenever she tries to eat she feels a burning sensation in the midline chest. She is somewhat been able to tolerate oral intake but has vomited up some medications she has tried such as omeprazole. She has been vomiting for the last 24 hours. She is unsure if this feels like her previous pancreatitis. She has tingling on the left side of her face that goes down her left arm as well. A Spurling's test was positive. Lipase elevated at 88, AST and ALT elevated at 57, 63, MG low at 1.5. TSH and Troponin both WNL.  In the ER given famotidine, sucralfate, KCl, Maalox, protonix, and zofran. She tolerated a PO challenge. Will be discharged with Ceftine, Zofran and PPI. Patient does not have a car and says she is unable to go to pharmacy tonight. Agrees to follow up with PCP and GI.     ED Course as of 07/26/24 1943 Fri Jul 26, 2024   1531 EKG shows tachycardia, no STEMI seen   1547 EKG shows sinus tachycardia 102 beats a minute, normal axis, normal CA, QRS and QTc durations, no ST elevations or acute ischemic T wave changes.   1626 Patient is a 33-year-old female with history of  pancreatitis, hepatic steatosis, asthma, gastritis who presents emergency department for evaluation of a burning sensation in her chest/epigastrium/abdominal pain, nausea vomiting and tingling in her upper extremities as well as face for the past several days.  She has noted intermittent burning in her chest for the past several weeks but got worse on Tuesday and Wednesday and has been more persistent since.  She has had several episodes of vomiting as well with some faint blood streaks a few days ago but no hematemesis today.  No blood in her stools.  Denies any hematuria but has had a minimal amount of urinary discomfort.  No fevers at home.  No new cough.  No diplopia or significant lightheadedness.  No difficulty with ambulation.  Has had a prior cholecystectomy as well as appendectomy.   denies prior history of blood clots.  Also notes intermittent feeling  of sweatiness and fatigue over the past several weeks.    Exam patient is well-appearing no acute distress.  Hemodynamically stable and afebrile.  Saturating well on room air.  Borderline tachycardic.  Lungs are clear without any wheezes or rails.  Somewhat diffuse abdominal tenderness without any guarding or rebound.  No lower extremity edema.    Initial differential includes was not limited to pancreatitis, gastritis, ACS.  Abdominal exam is reassuring here without any focal tenderness or rebound or guarding and has had prior cholecystectomy and appendectomy.  Will hold off on cross-sectional ridging at this point in time.  Will obtain blood work to evaluate for signs of pancreatitis.  Also EKG and troponin.  Will also check a TSH eval for possible hyperthyroidism.   1716 Labs reviewed and interpreted myself.  BMP shows mild hypokalemia at 3.2 oral repletion ordered.  No anion gap.  CBC shows a normal white count and hemoglobin.  Mild transaminitis but bilirubin within normal limits.  Lipase 88 but not 3 times upper limit of normal.  Magnesium slightly low  at 1.5.  TSH within normal limits.  Troponin is negative at less than 6 and doubt ACS given duration of her symptoms.   1717 Patient states that she cannot take the oral potassium powder because it burns too much therefore extended release tablet ordered.       At the conclusion of the encounter I discussed the results of all of the tests and the disposition. The questions were answered. The patient or family acknowledged understanding and was agreeable with the care plan.     0 minutes of critical care time     MEDICATIONS GIVEN IN THE EMERGENCY:  Medications   pantoprazole (PROTONIX) EC tablet 40 mg (40 mg Oral $Given 7/26/24 1633)   ondansetron (ZOFRAN ODT) ODT tab 4 mg (4 mg Oral $Given 7/26/24 1615)   alum & mag hydroxide-simethicone (MAALOX) suspension 15 mL (15 mLs Oral $Given 7/26/24 1633)   sodium chloride 0.9% BOLUS 1,000 mL (0 mLs Intravenous Stopped 7/26/24 1930)   potassium chloride (KLOR-CON) Packet 40 mEq (40 mEq Oral Not Given 7/26/24 1712)   potassium chloride parul ER (KLOR-CON M20) CR tablet 40 mEq (40 mEq Oral $Given 7/26/24 1720)   famotidine (PEPCID) tablet 20 mg (20 mg Oral $Given 7/26/24 1750)   sucralfate (CARAFATE) tablet 1 g (1 g Oral $Given 7/26/24 1750)   cefuroxime (CEFTIN) tablet 250 mg (250 mg Oral $Given 7/26/24 1930)       NEW PRESCRIPTIONS STARTED AT TODAY'S ER VISIT  New Prescriptions    CEFUROXIME (CEFTIN) 250 MG TABLET    Take 1 tablet (250 mg) by mouth 2 times daily    FAMOTIDINE (PEPCID) 20 MG TABLET    Take 1 tablet (20 mg) by mouth 2 times daily    ONDANSETRON (ZOFRAN ODT) 4 MG ODT TAB    Take 1 tablet (4 mg) by mouth every 8 hours as needed for nausea    SUCRALFATE (CARAFATE) 1 GM TABLET    Take 1 tablet (1 g) by mouth 4 times daily          =================================================================    HPI    Patient information was obtained from: patient     Use of : N/A         July ROJAS Nestor is a 33 year old female with a pertinent history of  gastritis, asthma, hepatic steatosis, anemia, thrombocytopenia, pancreatitis, and alcohol use disorder who presents to this ED on 7/26/24 for evaluation of chest pain, abdominal pain, diaphoresis. She also feels like her heart beats very fast occasionally. She is having chest pain described as a heavy pressure and present since 7/23. The abdominal pain is localized to her epigastric region and is sharp. Whenever she tries to eat she feels a burning sensation in the midline chest. She is somewhat been able to tolerate oral intake but has vomited up some medications she has tried such as omeprazole. She has been vomiting for the last 24 hours. She is unsure if this feels like her previous pancreatitis. She has tingling on the left side of her face that goes down her left arm as well.           PAST MEDICAL HISTORY:  No past medical history on file.    PAST SURGICAL HISTORY:  No past surgical history on file.        CURRENT MEDICATIONS:    cefuroxime (CEFTIN) 250 MG tablet  famotidine (PEPCID) 20 MG tablet  ondansetron (ZOFRAN ODT) 4 MG ODT tab  sucralfate (CARAFATE) 1 GM tablet  albuterol (PROAIR HFA/PROVENTIL HFA/VENTOLIN HFA) 108 (90 Base) MCG/ACT inhaler  ipratropium - albuterol 0.5 mg/2.5 mg/3 mL (DUONEB) 0.5-2.5 (3) MG/3ML neb solution        ALLERGIES:  No Known Allergies    FAMILY HISTORY:  No family history on file.    SOCIAL HISTORY:   Social History     Socioeconomic History    Marital status:      Social Determinants of Health     Financial Resource Strain: Low Risk  (4/17/2024)    Received from [facility]    Financial Resource Strain     Difficulty of Paying Living Expenses: 3   Food Insecurity: No Food Insecurity (4/17/2024)    Received from [facility]    Food Insecurity     Worried About Running Out of Food in the Last Year: 1   Transportation Needs: No Transportation Needs (4/17/2024)    Received from [facility] &  [facility]    Transportation Needs     Lack of Transportation (Medical): 1   Physical Activity: Not on File (10/16/2021)    Received from [facility]    Physical Activity     Physical Activity: 0   Stress: Not on File (10/16/2021)    Received from [facility]    Stress     Stress: 0   Social Connections: Socially Integrated (4/17/2024)    [facility]    Social Connections     Frequency of Communication with Friends and Family: 0   Housing Stability: Low Risk  (4/17/2024)    Received from [facility]    Housing Stability     Unable to Pay for Housing in the Last Year: 1       VITALS:  /84   Pulse 83   Temp 98.8  F (37.1  C) (Oral)   Resp (!) 35   Ht 1.676 m (5' 6\")   Wt 77.1 kg (170 lb)   LMP 06/04/2024   SpO2 94%   BMI 27.44 kg/m      PHYSICAL EXAM    Constitutional: Well developed, Well nourished  HENT: Normocephalic, Atraumatic, Bilateral external ears normal  Neck-  Normal range of motion, No stridor.    Eyes: EOMI, Conjunctiva normal, No discharge.   Respiratory: Normal breath sounds, No respiratory distress, No wheezing, Speaks full sentences easily. No cough.    Cardiovascular: Tachycardic, Regular rhythm,  No murmurs  GI: No excessive obesity. Soft, Tenderness to palpation in epigastric region, No masses, No rebound or guarding.    Musculoskeletal: 2+ DP pulses. No edema.  No cyanosis, No clubbing. Good range of motion in all major joints.   Integument: Warm, Dry, No erythema, No rash. No petechiae. +Spurling test  Neurologic: Alert & oriented x 3, Normal motor function, No focal deficits noted. Normal gait.    Psychiatric: Affect normal, Judgment normal, Mood normal. Cooperative.      LAB:  All pertinent labs reviewed and interpreted.  Results for orders placed or performed during the hospital encounter of 07/26/24   Basic metabolic panel   Result Value Ref Range    Sodium 135 135 - 145 mmol/L    Potassium 3.2 (L) 3.4 " - 5.3 mmol/L    Chloride 91 (L) 98 - 107 mmol/L    Carbon Dioxide (CO2) 32 (H) 22 - 29 mmol/L    Anion Gap 12 7 - 15 mmol/L    Urea Nitrogen 8.7 6.0 - 20.0 mg/dL    Creatinine 0.95 0.51 - 0.95 mg/dL    GFR Estimate 80 >60 mL/min/1.73m2    Calcium 10.0 8.8 - 10.4 mg/dL    Glucose 112 (H) 70 - 99 mg/dL   Result Value Ref Range    Troponin T, High Sensitivity <6 <=14 ng/L   Result Value Ref Range    Lipase 88 (H) 13 - 60 U/L   Hepatic function panel   Result Value Ref Range    Protein Total 7.7 6.4 - 8.3 g/dL    Albumin 4.7 3.5 - 5.2 g/dL    Bilirubin Total 1.0 <=1.2 mg/dL    Alkaline Phosphatase 94 40 - 150 U/L    AST 57 (H) 0 - 45 U/L    ALT 63 (H) 0 - 50 U/L    Bilirubin Direct 0.31 (H) 0.00 - 0.30 mg/dL   TSH with free T4 reflex   Result Value Ref Range    TSH 2.95 0.30 - 4.20 uIU/mL   Result Value Ref Range    Magnesium 1.5 (L) 1.7 - 2.3 mg/dL   UA with Microscopic reflex to Culture    Specimen: Urine, NOS   Result Value Ref Range    Color Urine Yellow Colorless, Straw, Light Yellow, Yellow    Appearance Urine Turbid (A) Clear    Glucose Urine Negative Negative mg/dL    Bilirubin Urine Negative Negative    Ketones Urine Trace (A) Negative mg/dL    Specific Gravity Urine 1.013 1.001 - 1.030    Blood Urine Negative Negative    pH Urine 6.0 5.0 - 7.0    Protein Albumin Urine 20 (A) Negative mg/dL    Urobilinogen Urine <2.0 <2.0 mg/dL    Nitrite Urine Positive (A) Negative    Leukocyte Esterase Urine 500 Ma[place]dra/uL (A) Negative    Bacteria Urine Many (A) None Seen /HPF    Mucus Urine Present (A) None Seen /LPF    RBC Urine 5 (H) <=2 /HPF    WBC Urine 56 (H) <=5 /HPF    Squamous Epithelials Urine 2 (H) <=1 /HPF   HCG qualitative urine (UPT)   Result Value Ref Range    hCG Urine Qualitative Negative Negative   CBC with platelets and differential   Result Value Ref Range    WBC Count 8.5 4.0 - 11.0 10e3/uL    RBC Count 3.72 (L) 3.80 - 5.20 10e6/uL    Hemoglobin 13.2 11.7 - 15.7 g/dL    Hematocrit 38.2 35.0 - 47.0 %    MCV  103 (H) 78 - 100 fL    MCH 35.5 (H) 26.5 - 33.0 pg    MCHC 34.6 31.5 - 36.5 g/dL    RDW 14.7 10.0 - 15.0 %    Platelet Count 150 150 - 450 10e3/uL    % Neutrophils 66 %    % Lymphocytes 26 %    % Monocytes 8 %    % Eosinophils 0 %    % Basophils 0 %    % Immature Granulocytes 0 %    NRBCs per 100 WBC 0 <1 /100    Absolute Neutrophils 5.7 1.6 - 8.3 10e3/uL    Absolute Lymphocytes 2.2 0.8 - 5.3 10e3/uL    Absolute Monocytes 0.6 0.0 - 1.3 10e3/uL    Absolute Eosinophils 0.0 0.0 - 0.7 10e3/uL    Absolute Basophils 0.0 0.0 - 0.2 10e3/uL    Absolute Immature Granulocytes 0.0 <=0.4 10e3/uL    Absolute NRBCs 0.0 10e3/uL   ECG 12-LEAD WITH MUSE (LHE)   Result Value Ref Range    Systolic Blood Pressure  mmHg    Diastolic Blood Pressure  mmHg    Ventricular Rate 102 BPM    Atrial Rate 102 BPM    AL Interval 140 ms    QRS Duration 70 ms     ms    QTc 461 ms    P Axis 72 degrees    R AXIS 3 degrees    T Axis 23 degrees    Interpretation ECG       Sinus tachycardia  Otherwise normal ECG  When compared with ECG of 09-Dec-2022 12:04,  No significant change was found  Confirmed by SEE ED PROVIDER NOTE FOR, ECG INTERPRETATION (4000),  Connor Mack ([id number]) on 7/26/2024 4:32:57 PM         RADIOLOGY:  Reviewed all pertinent imaging. Please see official radiology report.  No orders to display         Nayeli Moseley DO PGY-1  [facility] EMERGENCY ROOM  [address] [id number]  [phone]       Nayeli Moseley DO  Resident  07/26/24 [id number]

## 2024-07-27 LAB — BACTERIA UR CULT: ABNORMAL

## 2024-07-27 NOTE — ED NOTES
Discharge instructions discussed with pt. All questions answered. AVS and prescriptions handed to pt.

## 2024-07-27 NOTE — DISCHARGE INSTRUCTIONS
Workup in the emergency room today shows that you have a urinary tract infection.  You have been prescribed antibiotics to take for the next 5 days for this.  Additionally I think your stomach discomfort may be related in part to gastritis which is irritation of the stomach.  You been prescribed several medications to help with this.  If over the next several days you begin to develop fevers, intractable vomiting inability to tolerate the oral medication or other concerning symptoms please return to the emergency department for repeat evaluation.  Otherwise recommend routine follow-up with your primary care doctor as well as her GI doctor.

## 2024-07-28 ENCOUNTER — TELEPHONE (OUTPATIENT)
Dept: NURSING | Facility: CLINIC | Age: 34
End: 2024-07-28
Payer: COMMERCIAL

## 2024-07-28 NOTE — TELEPHONE ENCOUNTER
[facility]     Reason for call: Lab Result Notification     Lab Result (including Rx patient on, if applicable).  If culture, copy of lab report at bottom.  Lab Result: Final Urine Culture Report on 7/27/24  [facility] Emergency Dept discharge antibiotic prescribed: Cefuroxime (Ceftin) 250 mg PO tablet, 1 tablet (250 mg) by mouth 2 times daily for 5 days   Bacterial Growth: >100,000 CFU/ML Escherichia coli (Susceptible to antibiotic)  No change in treatment per [facility] ED lab result Urine Culture protocol.     Patient's current Symptoms:   Still having pain in my stomach and left side (rib cage)  Little improvement since the ED visit  No uti sx's    RN Recommendations/Instructions per [place] ED lab result protocol:   [facility] ED lab result protocol utilized: Urine culture    Patient/care giver notified to contact your PCP clinic or return to the Emergency department if your:  Symptoms worsen or other concerning symptoms.          Elgin Saunders RN